# Patient Record
Sex: FEMALE | Race: WHITE | Employment: FULL TIME | URBAN - METROPOLITAN AREA
[De-identification: names, ages, dates, MRNs, and addresses within clinical notes are randomized per-mention and may not be internally consistent; named-entity substitution may affect disease eponyms.]

---

## 2024-09-05 LAB — MAMMOGRAPHY, EXTERNAL: NORMAL

## 2024-10-21 SDOH — HEALTH STABILITY: PHYSICAL HEALTH: ON AVERAGE, HOW MANY DAYS PER WEEK DO YOU ENGAGE IN MODERATE TO STRENUOUS EXERCISE (LIKE A BRISK WALK)?: 5 DAYS

## 2024-10-21 SDOH — HEALTH STABILITY: PHYSICAL HEALTH: ON AVERAGE, HOW MANY MINUTES DO YOU ENGAGE IN EXERCISE AT THIS LEVEL?: 60 MIN

## 2024-10-23 ENCOUNTER — OFFICE VISIT (OUTPATIENT)
Age: 44
End: 2024-10-23
Payer: COMMERCIAL

## 2024-10-23 VITALS
DIASTOLIC BLOOD PRESSURE: 80 MMHG | HEIGHT: 68 IN | SYSTOLIC BLOOD PRESSURE: 110 MMHG | TEMPERATURE: 97.5 F | RESPIRATION RATE: 16 BRPM | HEART RATE: 79 BPM | OXYGEN SATURATION: 98 % | BODY MASS INDEX: 32.01 KG/M2 | WEIGHT: 211.2 LBS

## 2024-10-23 DIAGNOSIS — Z76.89 ENCOUNTER TO ESTABLISH CARE: ICD-10-CM

## 2024-10-23 DIAGNOSIS — F50.811 MODERATE BINGE-EATING DISORDER: Primary | ICD-10-CM

## 2024-10-23 DIAGNOSIS — G43.009 MIGRAINE WITHOUT AURA AND WITHOUT STATUS MIGRAINOSUS, NOT INTRACTABLE: ICD-10-CM

## 2024-10-23 PROCEDURE — 99204 OFFICE O/P NEW MOD 45 MIN: CPT | Performed by: STUDENT IN AN ORGANIZED HEALTH CARE EDUCATION/TRAINING PROGRAM

## 2024-10-23 RX ORDER — NORETHINDRONE ACETATE AND ETHINYL ESTRADIOL, ETHINYL ESTRADIOL AND FERROUS FUMARATE 1MG-10(24)
KIT ORAL
COMMUNITY
Start: 2022-07-21

## 2024-10-23 RX ORDER — SUMATRIPTAN 100 MG/1
TABLET, FILM COATED ORAL
COMMUNITY
Start: 2024-05-01

## 2024-10-23 RX ORDER — BUPROPION HCL 300 MG
1 TABLET, EXTENDED RELEASE 24 HR ORAL DAILY
COMMUNITY
Start: 2023-08-21 | End: 2024-10-23

## 2024-10-23 SDOH — ECONOMIC STABILITY: INCOME INSECURITY: HOW HARD IS IT FOR YOU TO PAY FOR THE VERY BASICS LIKE FOOD, HOUSING, MEDICAL CARE, AND HEATING?: NOT HARD AT ALL

## 2024-10-23 SDOH — ECONOMIC STABILITY: FOOD INSECURITY: WITHIN THE PAST 12 MONTHS, YOU WORRIED THAT YOUR FOOD WOULD RUN OUT BEFORE YOU GOT MONEY TO BUY MORE.: NEVER TRUE

## 2024-10-23 SDOH — ECONOMIC STABILITY: FOOD INSECURITY: WITHIN THE PAST 12 MONTHS, THE FOOD YOU BOUGHT JUST DIDN'T LAST AND YOU DIDN'T HAVE MONEY TO GET MORE.: NEVER TRUE

## 2024-10-23 ASSESSMENT — PATIENT HEALTH QUESTIONNAIRE - PHQ9
SUM OF ALL RESPONSES TO PHQ QUESTIONS 1-9: 1
SUM OF ALL RESPONSES TO PHQ QUESTIONS 1-9: 1
2. FEELING DOWN, DEPRESSED OR HOPELESS: NOT AT ALL
SUM OF ALL RESPONSES TO PHQ QUESTIONS 1-9: 1
SUM OF ALL RESPONSES TO PHQ QUESTIONS 1-9: 1
SUM OF ALL RESPONSES TO PHQ9 QUESTIONS 1 & 2: 1
1. LITTLE INTEREST OR PLEASURE IN DOING THINGS: SEVERAL DAYS

## 2024-10-23 NOTE — PROGRESS NOTES
Chief Complaint   Patient presents with    New Patient     eviewed record in preparation for visit and have obtained necessary documentation.    Identified pt with two pt identifiers(name and ).      Health Maintenance Due   Topic    Depression Screen     HIV screen     Hepatitis C screen     Hepatitis B vaccine (1 of 3 - 19+ 3-dose series)    Cervical cancer screen     Diabetes screen     Lipids     Breast cancer screen     Flu vaccine (1)    COVID-19 Vaccine ( season)         Chief Complaint   Patient presents with    New Patient        Wt Readings from Last 3 Encounters:   10/23/24 95.8 kg (211 lb 3.2 oz)     Temp Readings from Last 3 Encounters:   10/23/24 97.5 °F (36.4 °C) (Temporal)     BP Readings from Last 3 Encounters:   10/23/24 110/80     Pulse Readings from Last 3 Encounters:   10/23/24 79           Learning Assessment:  :    Failed to redirect to the Timeline version of the REVFS SmartLink.    Depression Screening:  :         No data to display                Fall Risk Assessment:  :    Failed to redirect to the Timeline version of the REVFS SmartLink.    Abuse Screening:  :         No data to display               
(Temporal)   Resp 16   Ht 1.715 m (5' 7.5\")   Wt 95.8 kg (211 lb 3.2 oz)   LMP 10/21/2024   SpO2 98%   BMI 32.59 kg/m²     Physical Exam  Constitutional:       General: She is not in acute distress.     Appearance: Normal appearance. She is not ill-appearing.   Eyes:      General: No scleral icterus.     Conjunctiva/sclera: Conjunctivae normal.   Cardiovascular:      Rate and Rhythm: Normal rate and regular rhythm.      Heart sounds: Normal heart sounds. No murmur heard.  Pulmonary:      Effort: Pulmonary effort is normal.      Breath sounds: Normal breath sounds. No wheezing or rales.   Neurological:      General: No focal deficit present.      Mental Status: She is alert and oriented to person, place, and time.   Psychiatric:         Mood and Affect: Mood normal.         Behavior: Behavior normal.              Gurinder Mayen DO

## 2024-10-23 NOTE — ASSESSMENT & PLAN NOTE
Taper off Wellbutrin and will transition to Contrave. We also discussed psychotherapy to address emotional aspects of her eating as well. Provided with list of local psychiatry/therapy practices.

## 2024-12-06 ENCOUNTER — PATIENT MESSAGE (OUTPATIENT)
Age: 44
End: 2024-12-06

## 2024-12-09 RX ORDER — SUMATRIPTAN SUCCINATE 100 MG/1
100 TABLET ORAL
Qty: 9 TABLET | Refills: 3 | Status: SHIPPED | OUTPATIENT
Start: 2024-12-09 | End: 2024-12-09

## 2024-12-09 NOTE — TELEPHONE ENCOUNTER
Last office visit 10/23/24  NEXT APPT  With Internal Medicine (Gurinder Mayen DO)  01/24/2025 at 9:00 AM      Last fill on imitrex 5/1/24 quantity unavailable

## 2025-01-24 ENCOUNTER — OFFICE VISIT (OUTPATIENT)
Age: 45
End: 2025-01-24
Payer: COMMERCIAL

## 2025-01-24 ENCOUNTER — TELEPHONE (OUTPATIENT)
Age: 45
End: 2025-01-24

## 2025-01-24 VITALS
HEART RATE: 94 BPM | OXYGEN SATURATION: 99 % | DIASTOLIC BLOOD PRESSURE: 79 MMHG | TEMPERATURE: 98 F | SYSTOLIC BLOOD PRESSURE: 112 MMHG | HEIGHT: 68 IN | WEIGHT: 198 LBS | RESPIRATION RATE: 16 BRPM | BODY MASS INDEX: 30.01 KG/M2

## 2025-01-24 DIAGNOSIS — G43.009 MIGRAINE WITHOUT AURA AND WITHOUT STATUS MIGRAINOSUS, NOT INTRACTABLE: Primary | ICD-10-CM

## 2025-01-24 DIAGNOSIS — F50.811 MODERATE BINGE-EATING DISORDER: ICD-10-CM

## 2025-01-24 PROCEDURE — 99214 OFFICE O/P EST MOD 30 MIN: CPT | Performed by: STUDENT IN AN ORGANIZED HEALTH CARE EDUCATION/TRAINING PROGRAM

## 2025-01-24 SDOH — ECONOMIC STABILITY: FOOD INSECURITY: WITHIN THE PAST 12 MONTHS, THE FOOD YOU BOUGHT JUST DIDN'T LAST AND YOU DIDN'T HAVE MONEY TO GET MORE.: NEVER TRUE

## 2025-01-24 SDOH — ECONOMIC STABILITY: FOOD INSECURITY: WITHIN THE PAST 12 MONTHS, YOU WORRIED THAT YOUR FOOD WOULD RUN OUT BEFORE YOU GOT MONEY TO BUY MORE.: NEVER TRUE

## 2025-01-24 ASSESSMENT — PATIENT HEALTH QUESTIONNAIRE - PHQ9
SUM OF ALL RESPONSES TO PHQ9 QUESTIONS 1 & 2: 0
SUM OF ALL RESPONSES TO PHQ QUESTIONS 1-9: 0
1. LITTLE INTEREST OR PLEASURE IN DOING THINGS: NOT AT ALL
SUM OF ALL RESPONSES TO PHQ QUESTIONS 1-9: 0
2. FEELING DOWN, DEPRESSED OR HOPELESS: NOT AT ALL

## 2025-01-24 NOTE — TELEPHONE ENCOUNTER
Received call from Usama from [employer].  The PA for Rimegepant Sulfate was approved. Left a voicemail for patient.    Usama - [phone]

## 2025-01-24 NOTE — ASSESSMENT & PLAN NOTE
Will re-try Contrave at 1 tab AM and 1 tab PM. She did not have any issues with bleeding at this dose. She will monitor her symptoms and if she is still struggling with this, we discussed additional Wellbutrin vs Vyvanse to control symptoms. We also discussed potentially switching to a low dose of a GLP-1 medication. She prefers not to be on a GLP-1 but says she will consider it if we are not able to otherwise control symptoms without significant side effects.

## 2025-01-24 NOTE — ASSESSMENT & PLAN NOTE
Did not tolerate triptan and reports that she would continue to have multiple day headaches despite using the Imitrex. Will try Nurtec daily PRN. Also discussed use for migraine prevention if headaches become more frequent.    Orders:    rimegepant sulfate 75 MG TBDP; Take 75 mg by mouth daily as needed (migraine)

## 2025-01-24 NOTE — PROGRESS NOTES
Aminata Yip is a 44 y.o. female presenting for Eating Disorder, Follow-up, and Medication Problem (Side effects from contrave (vaginal bleeding for over a month); stopped taking 4 days ago,)      /79   Pulse 94   Temp 98 °F (36.7 °C) (Oral)   Resp 16   Ht 1.715 m (5' 7.5\")   Wt 89.8 kg (198 lb)   SpO2 99%   BMI 30.55 kg/m²       Current Outpatient Medications   Medication Sig Dispense Refill    SUMAtriptan (IMITREX) 100 MG tablet Take 1 tablet by mouth once as needed for Migraine 9 tablet 3    doxyLAMINE succinate (UNISOM SLEEPTABS) 25 MG tablet       LO LOESTRIN FE 1 MG-10 MCG / 10 MCG tablet  (Patient not taking: Reported on 1/24/2025)      naltrexone-buPROPion (CONTRAVE) 8-90 MG per extended release tablet Take 2 tablets by mouth 2 times daily (Patient not taking: Reported on 1/24/2025) 120 tablet 11     No current facility-administered medications for this visit.        1. \"Have you been to the ER, urgent care clinic since your last visit?  Hospitalized since your last visit?\" No    2. \"Have you seen or consulted any other health care providers outside of the [facility] since your last visit?\" Yes Where: OBGYN Dr Radha Jimenez       3. For patients aged 45-75: Has the patient had a colonoscopy / FIT/ Cologuard? No      If the patient is female:    4. For patients aged 40-74: Has the patient had a mammogram within the past 2 years? Yes - Care Gap present. Most recent result on file      5. For patients aged 21-65: Has the patient had a pap smear? Yes - Care Gap present. Most recent result on file

## 2025-01-24 NOTE — PROGRESS NOTES
Aminata Yip is a 44 y.o. year old female who presents today (01/24/25) for follow-up/ Eating Disorder, Follow-up, and Medication Problem (Side effects from contrave (vaginal bleeding for over a month); stopped taking 4 days ago,)      Assessment & Plan:     Assessment & Plan  Migraine without aura and without status migrainosus, not intractable    Did not tolerate triptan and reports that she would continue to have multiple day headaches despite using the Imitrex. Will try Nurtec daily PRN. Also discussed use for migraine prevention if headaches become more frequent.    Orders:    rimegepant sulfate 75 MG TBDP; Take 75 mg by mouth daily as needed (migraine)    Moderate binge-eating disorder    Will re-try Contrave at 1 tab AM and 1 tab PM. She did not have any issues with bleeding at this dose. She will monitor her symptoms and if she is still struggling with this, we discussed additional Wellbutrin vs Vyvanse to control symptoms. We also discussed potentially switching to a low dose of a GLP-1 medication. She prefers not to be on a GLP-1 but says she will consider it if we are not able to otherwise control symptoms without significant side effects.       RTC in 3 months for followup    Subjective:   Binge Eating Disorder  - Was doing very well with Contrave, had titrated to dose of 2 pills AM and 1 pill PM. Unfortunately she developed vaginal bleeding and stopped the medication about 4 days ago. She says that her bleeding also stopped around that time    Migraines  - She has also had an increase in migraine headaches recently and says that Imitrex has not been as effective recently. She also notes significant fatigue and feels groggy after taking it, she is not able to focus to be able to work and so she has to call out on days that she has a headache    Recent specialist visits:  None       Review of Systems:  Review of Systems   All other systems reviewed and are negative.        PMHx:    Patient Active

## 2025-02-10 ENCOUNTER — TELEPHONE (OUTPATIENT)
Age: 45
End: 2025-02-10

## 2025-02-14 ENCOUNTER — PATIENT MESSAGE (OUTPATIENT)
Age: 45
End: 2025-02-14

## 2025-02-14 DIAGNOSIS — F50.811 MODERATE BINGE-EATING DISORDER: Primary | ICD-10-CM

## 2025-02-17 RX ORDER — BUPROPION HYDROCHLORIDE 75 MG/1
75 TABLET ORAL 2 TIMES DAILY
Qty: 60 TABLET | Refills: 3 | Status: SHIPPED | OUTPATIENT
Start: 2025-02-17

## 2025-03-21 DIAGNOSIS — F50.811 MODERATE BINGE-EATING DISORDER: ICD-10-CM

## 2025-03-21 RX ORDER — BUPROPION HYDROCHLORIDE 75 MG/1
75 TABLET ORAL 2 TIMES DAILY
Qty: 60 TABLET | Refills: 3 | OUTPATIENT
Start: 2025-03-21

## 2025-03-21 NOTE — TELEPHONE ENCOUNTER
Pt last seen on 1/24/2025. Due to return in 3 months.    Has appt on 4/30/2025.    Rx last filled on 2/17/25 #60/3RF.

## 2025-04-30 ENCOUNTER — OFFICE VISIT (OUTPATIENT)
Age: 45
End: 2025-04-30
Payer: COMMERCIAL

## 2025-04-30 VITALS
BODY MASS INDEX: 35.03 KG/M2 | TEMPERATURE: 98.5 F | HEIGHT: 67 IN | DIASTOLIC BLOOD PRESSURE: 82 MMHG | SYSTOLIC BLOOD PRESSURE: 121 MMHG | OXYGEN SATURATION: 96 % | WEIGHT: 223.2 LBS | RESPIRATION RATE: 16 BRPM | HEART RATE: 85 BPM

## 2025-04-30 DIAGNOSIS — E66.811 CLASS 1 OBESITY DUE TO EXCESS CALORIES WITHOUT SERIOUS COMORBIDITY WITH BODY MASS INDEX (BMI) OF 34.0 TO 34.9 IN ADULT: Primary | ICD-10-CM

## 2025-04-30 DIAGNOSIS — E66.09 CLASS 1 OBESITY DUE TO EXCESS CALORIES WITHOUT SERIOUS COMORBIDITY WITH BODY MASS INDEX (BMI) OF 34.0 TO 34.9 IN ADULT: Primary | ICD-10-CM

## 2025-04-30 PROCEDURE — 99214 OFFICE O/P EST MOD 30 MIN: CPT | Performed by: STUDENT IN AN ORGANIZED HEALTH CARE EDUCATION/TRAINING PROGRAM

## 2025-04-30 NOTE — PROGRESS NOTES
Aminata Yip is a 44 y.o. year old female who presents today (04/30/25) for follow-up/ 3 Month Follow-Up      Assessment & Plan:     Assessment & Plan  1. Obesity  2. Binge eating disorder  - Failed Wellbutrin, not able to tolerate Contrave due to side effect of bleeding  - She has done some research and is interested in trial of Wegovy, says that she did some research and it is covered by her insurance - sent Rx for 0.25 mg weekly today    2. Migraine: Chronic.  - Discontinued birth control due to severe migraines, now more manageable.  - Uses Nurtec as preventative during her period, Imitrex PRN for when she has headaches    Follow-up in 3 months.    Subjective:     History of Present Illness  The patient presents for weight management and migraine.    Weight Management  She reports a decline in her condition since the last consultation. Inconsistent use of Contrave has triggered symptoms. Discontinued Contrave in 01/2025 due to adverse reactions with fatty foods. Attempted to resume Wellbutrin upon return, but it was unsuccessful. Experienced substantial weight gain since the last visit. Considering scheduling next appointment post trip in 08/2025 for accountability. Interested in trying Wegovy and curious about its interaction with Wellbutrin. Researched stimulant medications for binge eating disorder but concerned about anxiety exacerbation. Insurance coverage for Wegovy confirmed. Contrave was effective but discontinued due to severe bleeding. Contemplating fiber supplements to manage potential constipation side effects of Wegovy. Wellbutrin was initially effective, but symptoms recurred, leading to Contrave, which caused severe side effects.    Migraines  Migraines believed to be exacerbated by birth control, particularly around menstrual cycle. Recalled 4-day migraine episode while on birth control, unresponsive to Imitrex. Discontinued birth control, occasional headaches experienced. Nurtec not approved

## 2025-05-23 DIAGNOSIS — E66.811 CLASS 1 OBESITY DUE TO EXCESS CALORIES WITHOUT SERIOUS COMORBIDITY WITH BODY MASS INDEX (BMI) OF 34.0 TO 34.9 IN ADULT: ICD-10-CM

## 2025-05-23 DIAGNOSIS — E66.09 CLASS 1 OBESITY DUE TO EXCESS CALORIES WITHOUT SERIOUS COMORBIDITY WITH BODY MASS INDEX (BMI) OF 34.0 TO 34.9 IN ADULT: ICD-10-CM

## 2025-05-23 RX ORDER — SEMAGLUTIDE 0.25 MG/.5ML
INJECTION, SOLUTION SUBCUTANEOUS
Qty: 2 ML | Refills: 0 | Status: SHIPPED | OUTPATIENT
Start: 2025-05-23

## 2025-05-23 NOTE — TELEPHONE ENCOUNTER
Last office visit 4/30/25  Next Appt  With Internal Medicine (Gurinder Mayen DO)  08/18/2025 at 11:30 AM    Last fill on wegovy 4/30/25 2 ml with no additional refills

## 2025-05-28 DIAGNOSIS — E66.09 CLASS 1 OBESITY DUE TO EXCESS CALORIES WITHOUT SERIOUS COMORBIDITY WITH BODY MASS INDEX (BMI) OF 34.0 TO 34.9 IN ADULT: ICD-10-CM

## 2025-05-28 DIAGNOSIS — E66.811 CLASS 1 OBESITY DUE TO EXCESS CALORIES WITHOUT SERIOUS COMORBIDITY WITH BODY MASS INDEX (BMI) OF 34.0 TO 34.9 IN ADULT: ICD-10-CM

## 2025-05-28 RX ORDER — SEMAGLUTIDE 0.25 MG/.5ML
INJECTION, SOLUTION SUBCUTANEOUS
Qty: 2 ML | Refills: 0 | Status: CANCELLED | OUTPATIENT
Start: 2025-05-28

## 2025-05-28 NOTE — TELEPHONE ENCOUNTER
Last office visit 4/30/25  Next Appt  With Internal Medicine (Gurinder Mayen DO)  08/18/2025 at 11:30 AM    Last fill on wegovy 5/23/25 2ml with no additional refills

## 2025-05-29 ENCOUNTER — TELEPHONE (OUTPATIENT)
Age: 45
End: 2025-05-29

## 2025-05-29 DIAGNOSIS — E66.09 CLASS 1 OBESITY DUE TO EXCESS CALORIES WITHOUT SERIOUS COMORBIDITY WITH BODY MASS INDEX (BMI) OF 34.0 TO 34.9 IN ADULT: Primary | ICD-10-CM

## 2025-05-29 DIAGNOSIS — E66.811 CLASS 1 OBESITY DUE TO EXCESS CALORIES WITHOUT SERIOUS COMORBIDITY WITH BODY MASS INDEX (BMI) OF 34.0 TO 34.9 IN ADULT: Primary | ICD-10-CM

## 2025-05-29 RX ORDER — SEMAGLUTIDE 0.25 MG/.5ML
INJECTION, SOLUTION SUBCUTANEOUS
Qty: 2 ML | Refills: 0 | OUTPATIENT
Start: 2025-05-29

## 2025-05-29 NOTE — TELEPHONE ENCOUNTER
Reason for call:  TC from patient. Patient stated that the wrong dosage was sent for the WEGOY. It should be for 1.25 ML.  Patient stated that she needed  the med sent again STAT.     Is this a new problem: Yes    Date of last appointment:  4/30/2025     Can we respond via [employer]: No    Best call back number:      Aminata Yip (Self) [phone] (Mobile)

## 2025-05-29 NOTE — TELEPHONE ENCOUNTER
Advised pt of MD's message regarding Wegovy. She states she would like to stay on the 0.25 mg as it is working for her. Also her pharmacy does not have 0.5 mg for her. Pt requesting MD to send in 0.25 mg to her pharmacy. Will forward to MD.

## 2025-06-20 DIAGNOSIS — E66.811 CLASS 1 OBESITY DUE TO EXCESS CALORIES WITHOUT SERIOUS COMORBIDITY WITH BODY MASS INDEX (BMI) OF 34.0 TO 34.9 IN ADULT: ICD-10-CM

## 2025-06-20 DIAGNOSIS — E66.09 CLASS 1 OBESITY DUE TO EXCESS CALORIES WITHOUT SERIOUS COMORBIDITY WITH BODY MASS INDEX (BMI) OF 34.0 TO 34.9 IN ADULT: ICD-10-CM

## 2025-06-20 RX ORDER — SUMATRIPTAN SUCCINATE 100 MG/1
100 TABLET ORAL
Qty: 9 TABLET | Refills: 3 | Status: SHIPPED | OUTPATIENT
Start: 2025-06-20

## 2025-06-20 NOTE — TELEPHONE ENCOUNTER
Last office visit 4/30/25  Next Appt  With Internal Medicine (Gurinder Mayen DO)  08/18/2025 at 11:30 AM    Last fill on imitrex 12/09/24 #9 with 3 additional refills     Last fill on wegovy 5/29/25 2ml with 1 additional refills

## 2025-07-18 DIAGNOSIS — E66.09 CLASS 1 OBESITY DUE TO EXCESS CALORIES WITHOUT SERIOUS COMORBIDITY WITH BODY MASS INDEX (BMI) OF 34.0 TO 34.9 IN ADULT: ICD-10-CM

## 2025-07-18 DIAGNOSIS — E66.811 CLASS 1 OBESITY DUE TO EXCESS CALORIES WITHOUT SERIOUS COMORBIDITY WITH BODY MASS INDEX (BMI) OF 34.0 TO 34.9 IN ADULT: ICD-10-CM

## 2025-07-18 NOTE — TELEPHONE ENCOUNTER
Last office visit 4/30/25  Next Appt  With Internal Medicine (Gurinder Mayen DO)  08/18/2025 at 11:30 AM    Last fill on wegovy 6/20/25 #2ml with 1 additional refill

## 2025-08-18 ENCOUNTER — OFFICE VISIT (OUTPATIENT)
Age: 45
End: 2025-08-18
Payer: COMMERCIAL

## 2025-08-18 VITALS
SYSTOLIC BLOOD PRESSURE: 109 MMHG | RESPIRATION RATE: 20 BRPM | BODY MASS INDEX: 30.64 KG/M2 | HEIGHT: 67 IN | TEMPERATURE: 97.1 F | WEIGHT: 195.2 LBS | OXYGEN SATURATION: 100 % | DIASTOLIC BLOOD PRESSURE: 74 MMHG | HEART RATE: 77 BPM

## 2025-08-18 DIAGNOSIS — E66.811 CLASS 1 OBESITY DUE TO EXCESS CALORIES WITHOUT SERIOUS COMORBIDITY WITH BODY MASS INDEX (BMI) OF 34.0 TO 34.9 IN ADULT: Primary | ICD-10-CM

## 2025-08-18 DIAGNOSIS — E66.09 CLASS 1 OBESITY DUE TO EXCESS CALORIES WITHOUT SERIOUS COMORBIDITY WITH BODY MASS INDEX (BMI) OF 34.0 TO 34.9 IN ADULT: Primary | ICD-10-CM

## 2025-08-18 DIAGNOSIS — N92.0 MENORRHAGIA WITH REGULAR CYCLE: ICD-10-CM

## 2025-08-18 DIAGNOSIS — G43.009 MIGRAINE WITHOUT AURA AND WITHOUT STATUS MIGRAINOSUS, NOT INTRACTABLE: ICD-10-CM

## 2025-08-18 PROCEDURE — 99214 OFFICE O/P EST MOD 30 MIN: CPT | Performed by: STUDENT IN AN ORGANIZED HEALTH CARE EDUCATION/TRAINING PROGRAM
